# Patient Record
Sex: MALE | Race: WHITE | NOT HISPANIC OR LATINO
[De-identification: names, ages, dates, MRNs, and addresses within clinical notes are randomized per-mention and may not be internally consistent; named-entity substitution may affect disease eponyms.]

---

## 2017-08-23 PROBLEM — Z00.00 ENCOUNTER FOR PREVENTIVE HEALTH EXAMINATION: Status: ACTIVE | Noted: 2017-08-23

## 2017-09-05 ENCOUNTER — APPOINTMENT (OUTPATIENT)
Dept: SURGERY | Facility: CLINIC | Age: 62
End: 2017-09-05

## 2017-10-13 ENCOUNTER — RESULT REVIEW (OUTPATIENT)
Age: 62
End: 2017-10-13

## 2017-10-16 ENCOUNTER — OUTPATIENT (OUTPATIENT)
Dept: OUTPATIENT SERVICES | Facility: HOSPITAL | Age: 62
LOS: 1 days | End: 2017-10-16
Payer: COMMERCIAL

## 2017-10-16 DIAGNOSIS — D12.5 BENIGN NEOPLASM OF SIGMOID COLON: ICD-10-CM

## 2017-10-16 DIAGNOSIS — Z96.649 PRESENCE OF UNSPECIFIED ARTIFICIAL HIP JOINT: Chronic | ICD-10-CM

## 2017-10-17 ENCOUNTER — INPATIENT (INPATIENT)
Facility: HOSPITAL | Age: 62
LOS: 2 days | Discharge: HOME | DRG: 331 | End: 2017-10-20
Attending: COLON & RECTAL SURGERY | Admitting: COLON & RECTAL SURGERY
Payer: COMMERCIAL

## 2017-10-17 ENCOUNTER — RESULT REVIEW (OUTPATIENT)
Age: 62
End: 2017-10-17

## 2017-10-17 VITALS
SYSTOLIC BLOOD PRESSURE: 117 MMHG | RESPIRATION RATE: 18 BRPM | HEIGHT: 65 IN | OXYGEN SATURATION: 97 % | HEART RATE: 62 BPM | TEMPERATURE: 98 F | DIASTOLIC BLOOD PRESSURE: 57 MMHG | WEIGHT: 210.1 LBS

## 2017-10-17 DIAGNOSIS — Z96.649 PRESENCE OF UNSPECIFIED ARTIFICIAL HIP JOINT: Chronic | ICD-10-CM

## 2017-10-17 LAB
ANION GAP SERPL CALC-SCNC: 13 MMOL/L — SIGNIFICANT CHANGE UP (ref 5–17)
BUN SERPL-MCNC: 16 MG/DL — SIGNIFICANT CHANGE UP (ref 7–23)
CALCIUM SERPL-MCNC: 9.1 MG/DL — SIGNIFICANT CHANGE UP (ref 8.4–10.5)
CHLORIDE SERPL-SCNC: 102 MMOL/L — SIGNIFICANT CHANGE UP (ref 96–108)
CO2 SERPL-SCNC: 22 MMOL/L — SIGNIFICANT CHANGE UP (ref 22–31)
CREAT SERPL-MCNC: 1.11 MG/DL — SIGNIFICANT CHANGE UP (ref 0.5–1.3)
GLUCOSE BLDC GLUCOMTR-MCNC: 104 MG/DL — HIGH (ref 70–99)
GLUCOSE BLDC GLUCOMTR-MCNC: 120 MG/DL — HIGH (ref 70–99)
GLUCOSE BLDC GLUCOMTR-MCNC: 126 MG/DL — HIGH (ref 70–99)
GLUCOSE BLDC GLUCOMTR-MCNC: 144 MG/DL — HIGH (ref 70–99)
GLUCOSE SERPL-MCNC: 171 MG/DL — HIGH (ref 70–99)
HCT VFR BLD CALC: 37.2 % — LOW (ref 39–50)
HGB BLD-MCNC: 12.7 G/DL — LOW (ref 13–17)
MAGNESIUM SERPL-MCNC: 1.7 MG/DL — SIGNIFICANT CHANGE UP (ref 1.6–2.6)
MCHC RBC-ENTMCNC: 29.3 PG — SIGNIFICANT CHANGE UP (ref 27–34)
MCHC RBC-ENTMCNC: 34.1 G/DL — SIGNIFICANT CHANGE UP (ref 32–36)
MCV RBC AUTO: 85.7 FL — SIGNIFICANT CHANGE UP (ref 80–100)
PHOSPHATE SERPL-MCNC: 2.8 MG/DL — SIGNIFICANT CHANGE UP (ref 2.5–4.5)
PLATELET # BLD AUTO: 192 K/UL — SIGNIFICANT CHANGE UP (ref 150–400)
POTASSIUM SERPL-MCNC: 4 MMOL/L — SIGNIFICANT CHANGE UP (ref 3.5–5.3)
POTASSIUM SERPL-SCNC: 4 MMOL/L — SIGNIFICANT CHANGE UP (ref 3.5–5.3)
RBC # BLD: 4.34 M/UL — SIGNIFICANT CHANGE UP (ref 4.2–5.8)
RBC # FLD: 14.4 % — SIGNIFICANT CHANGE UP (ref 10.3–16.9)
SODIUM SERPL-SCNC: 137 MMOL/L — SIGNIFICANT CHANGE UP (ref 135–145)
SURGICAL PATHOLOGY STUDY: SIGNIFICANT CHANGE UP
WBC # BLD: 11.3 K/UL — HIGH (ref 3.8–10.5)
WBC # FLD AUTO: 11.3 K/UL — HIGH (ref 3.8–10.5)

## 2017-10-17 RX ORDER — SODIUM CHLORIDE 9 MG/ML
1000 INJECTION, SOLUTION INTRAVENOUS
Qty: 0 | Refills: 0 | Status: DISCONTINUED | OUTPATIENT
Start: 2017-10-17 | End: 2017-10-20

## 2017-10-17 RX ORDER — MAGNESIUM SULFATE 500 MG/ML
1 VIAL (ML) INJECTION ONCE
Qty: 0 | Refills: 0 | Status: COMPLETED | OUTPATIENT
Start: 2017-10-17 | End: 2017-10-17

## 2017-10-17 RX ORDER — ONDANSETRON 8 MG/1
4 TABLET, FILM COATED ORAL EVERY 6 HOURS
Qty: 0 | Refills: 0 | Status: DISCONTINUED | OUTPATIENT
Start: 2017-10-17 | End: 2017-10-20

## 2017-10-17 RX ORDER — KETOROLAC TROMETHAMINE 30 MG/ML
30 SYRINGE (ML) INJECTION EVERY 6 HOURS
Qty: 0 | Refills: 0 | Status: DISCONTINUED | OUTPATIENT
Start: 2017-10-17 | End: 2017-10-18

## 2017-10-17 RX ORDER — GLUCAGON INJECTION, SOLUTION 0.5 MG/.1ML
1 INJECTION, SOLUTION SUBCUTANEOUS ONCE
Qty: 0 | Refills: 0 | Status: DISCONTINUED | OUTPATIENT
Start: 2017-10-17 | End: 2017-10-20

## 2017-10-17 RX ORDER — DEXTROSE 50 % IN WATER 50 %
1 SYRINGE (ML) INTRAVENOUS ONCE
Qty: 0 | Refills: 0 | Status: DISCONTINUED | OUTPATIENT
Start: 2017-10-17 | End: 2017-10-20

## 2017-10-17 RX ORDER — DEXTROSE 50 % IN WATER 50 %
12.5 SYRINGE (ML) INTRAVENOUS ONCE
Qty: 0 | Refills: 0 | Status: DISCONTINUED | OUTPATIENT
Start: 2017-10-17 | End: 2017-10-20

## 2017-10-17 RX ORDER — DEXTROSE 50 % IN WATER 50 %
25 SYRINGE (ML) INTRAVENOUS ONCE
Qty: 0 | Refills: 0 | Status: DISCONTINUED | OUTPATIENT
Start: 2017-10-17 | End: 2017-10-20

## 2017-10-17 RX ORDER — METOCLOPRAMIDE HCL 10 MG
10 TABLET ORAL ONCE
Qty: 0 | Refills: 0 | Status: DISCONTINUED | OUTPATIENT
Start: 2017-10-17 | End: 2017-10-20

## 2017-10-17 RX ORDER — BUPIVACAINE 13.3 MG/ML
20 INJECTION, SUSPENSION, LIPOSOMAL INFILTRATION ONCE
Qty: 0 | Refills: 0 | Status: DISCONTINUED | OUTPATIENT
Start: 2017-10-17 | End: 2017-10-20

## 2017-10-17 RX ORDER — OLMESARTAN MEDOXOMIL 5 MG/1
1 TABLET, FILM COATED ORAL
Qty: 0 | Refills: 0 | COMMUNITY

## 2017-10-17 RX ORDER — ACETAMINOPHEN 500 MG
1000 TABLET ORAL ONCE
Qty: 0 | Refills: 0 | Status: COMPLETED | OUTPATIENT
Start: 2017-10-17 | End: 2017-10-17

## 2017-10-17 RX ORDER — HEPARIN SODIUM 5000 [USP'U]/ML
7500 INJECTION INTRAVENOUS; SUBCUTANEOUS EVERY 8 HOURS
Qty: 0 | Refills: 0 | Status: DISCONTINUED | OUTPATIENT
Start: 2017-10-17 | End: 2017-10-20

## 2017-10-17 RX ORDER — INSULIN LISPRO 100/ML
VIAL (ML) SUBCUTANEOUS
Qty: 0 | Refills: 0 | Status: DISCONTINUED | OUTPATIENT
Start: 2017-10-17 | End: 2017-10-20

## 2017-10-17 RX ORDER — ACETAMINOPHEN 500 MG
1000 TABLET ORAL EVERY 6 HOURS
Qty: 0 | Refills: 0 | Status: COMPLETED | OUTPATIENT
Start: 2017-10-17 | End: 2017-10-17

## 2017-10-17 RX ADMIN — Medication 30 MILLIGRAM(S): at 18:27

## 2017-10-17 RX ADMIN — Medication 400 MILLIGRAM(S): at 22:15

## 2017-10-17 RX ADMIN — Medication 30 MILLIGRAM(S): at 18:12

## 2017-10-17 RX ADMIN — Medication 400 MILLIGRAM(S): at 13:23

## 2017-10-17 RX ADMIN — Medication 1000 MILLIGRAM(S): at 22:30

## 2017-10-17 RX ADMIN — Medication 100 GRAM(S): at 13:57

## 2017-10-17 RX ADMIN — HEPARIN SODIUM 7500 UNIT(S): 5000 INJECTION INTRAVENOUS; SUBCUTANEOUS at 22:14

## 2017-10-17 RX ADMIN — Medication 1000 MILLIGRAM(S): at 14:02

## 2017-10-17 NOTE — H&P ADULT - HISTORY OF PRESENT ILLNESS
62M w/ recently found sigmoid polyp in the mid-sigmoid area on colonoscopy s/p biopsy showing adenocarcinoma now presents for elective resection after CT showed no metastatic disease.

## 2017-10-17 NOTE — PROGRESS NOTE ADULT - SUBJECTIVE AND OBJECTIVE BOX
POST-OPERATIVE NOTE    Procedure: laparoscopic sigmoidectomy    Diagnosis/Indication: sigmoid adenocarcinoma    Surgeon: Dr. Lusi    S: Pt has no complaints. Denies CP, SOB, COULTER, calf tenderness, nausea, and vomiting. Pain controlled with medication.     O:  T(C): 36.4 (10-17-17 @ 14:00), Max: 37 (10-17-17 @ 12:15)  T(F): 97.5 (10-17-17 @ 14:00), Max: 98.6 (10-17-17 @ 12:15)  HR: 56 (10-17-17 @ 14:00) (56 - 78)  BP: 116/62 (10-17-17 @ 14:00) (107/58 - 127/64)  RR: 19 (10-17-17 @ 14:00) (18 - 24)  SpO2: 100% (10-17-17 @ 14:00) (98% - 100%)  Wt(kg): --                        12.7   11.3  )-----------( 192      ( 17 Oct 2017 12:55 )             37.2     10-17    137  |  102  |  16  ----------------------------<  171<H>  4.0   |  22  |  1.11    Ca    9.1      17 Oct 2017 12:55  Phos  2.8     10-17  Mg     1.7     10-17        Gen: NAD, resting comfortably in bed  C/V: NSR  Pulm: Nonlabored breathing, no respiratory distress  Abd: soft, ND. appropriately tender to palpation in the LLQ  Extrem: WWP, no calf edema or tenderness, SCDs in place

## 2017-10-17 NOTE — BRIEF OPERATIVE NOTE - OPERATION/FINDINGS
heavy amounts of pericolonic fat appendages that increased difficulty of case but ultimately was able to complete procedure laparoscopically.  The tumor was encountered just distal to the tattoo and a 5 cm proximal and distal margin were obtained.  On inspection, a pedunculated polyp was seen inside the lumen and this was sent off to pathology.  Primary reanastomosis was fashioned using a 29 mm EEA stapler. heavy amounts of pericolonic fat appendages that increased difficulty of case but ultimately was able to complete procedure laparoscopically.  The tumor was encountered just distal to the tattoo and a 5 cm proximal and distal margin were obtained.  On inspection, a pedunculated polyp was seen inside the lumen and this was sent off to pathology.  Primary reanastomosis was fashioned using a 29 mm EEA stapler.  A saline leak test was negative.

## 2017-10-17 NOTE — H&P ADULT - ASSESSMENT
62M w/ recently found sigmoid polyp in the mid-sigmoid area on colonoscopy s/p biopsy showing adenocarcinoma now presents for elective resection after CT showed no metastatic disease, now s/p laparoscopic sigmoidectomy.    1. postop care s/p resection, doing well  - NPO / IVF  - pain control w/ acetaminophen, ketorolac, but no narcotics per Dr. Janelle vo POD1  - OOB/A  - HSQ/SCDS    2. DM, stable  - ISS    3. HTN, stable  - holding home olmesartan    4. dispo  - home w/o home care services

## 2017-10-17 NOTE — PROGRESS NOTE ADULT - ASSESSMENT
62M DM s/p lap sigmoidectomy    1. Postoperative care s/p laprascopic sigmoidectomy  NPO ( CLD post op day 1)  IVF  segura (d/c tomorrow 10/18)  IV tylenol/IV toradol for pain (no narcs per Dr. Luis)  SQH/ICD  am labs    2. DM  ISS    3, HTN  home meds being held  (benicar)    4. Dispo

## 2017-10-17 NOTE — BRIEF OPERATIVE NOTE - PROCEDURE
<<-----Click on this checkbox to enter Procedure Laparoscopic sigmoidectomy  10/17/2017    Active  [id number]

## 2017-10-17 NOTE — H&P ADULT - NSHPPHYSICALEXAM_GEN_ALL_CORE
Constitutional: no acute distress, NC/AT  Respiratory: CTA/B  Cardiovascular: RRR, no M/R/G  Gastrointestinal: soft, NT/ND  Genitourinary: no CVA TTP

## 2017-10-18 LAB
ANION GAP SERPL CALC-SCNC: 12 MMOL/L — SIGNIFICANT CHANGE UP (ref 5–17)
BASOPHILS NFR BLD AUTO: 0.1 % — SIGNIFICANT CHANGE UP (ref 0–2)
BUN SERPL-MCNC: 17 MG/DL — SIGNIFICANT CHANGE UP (ref 7–23)
CALCIUM SERPL-MCNC: 8.7 MG/DL — SIGNIFICANT CHANGE UP (ref 8.4–10.5)
CHLORIDE SERPL-SCNC: 102 MMOL/L — SIGNIFICANT CHANGE UP (ref 96–108)
CO2 SERPL-SCNC: 24 MMOL/L — SIGNIFICANT CHANGE UP (ref 22–31)
CREAT SERPL-MCNC: 0.96 MG/DL — SIGNIFICANT CHANGE UP (ref 0.5–1.3)
EOSINOPHIL NFR BLD AUTO: 1 % — SIGNIFICANT CHANGE UP (ref 0–6)
GLUCOSE BLDC GLUCOMTR-MCNC: 103 MG/DL — HIGH (ref 70–99)
GLUCOSE BLDC GLUCOMTR-MCNC: 127 MG/DL — HIGH (ref 70–99)
GLUCOSE BLDC GLUCOMTR-MCNC: 76 MG/DL — SIGNIFICANT CHANGE UP (ref 70–99)
GLUCOSE BLDC GLUCOMTR-MCNC: 94 MG/DL — SIGNIFICANT CHANGE UP (ref 70–99)
GLUCOSE SERPL-MCNC: 109 MG/DL — HIGH (ref 70–99)
HBA1C BLD-MCNC: 6 % — HIGH (ref 4–5.6)
HCT VFR BLD CALC: 34.5 % — LOW (ref 39–50)
HGB BLD-MCNC: 11.6 G/DL — LOW (ref 13–17)
LYMPHOCYTES # BLD AUTO: 28.6 % — SIGNIFICANT CHANGE UP (ref 13–44)
MAGNESIUM SERPL-MCNC: 2 MG/DL — SIGNIFICANT CHANGE UP (ref 1.6–2.6)
MCHC RBC-ENTMCNC: 28.9 PG — SIGNIFICANT CHANGE UP (ref 27–34)
MCHC RBC-ENTMCNC: 33.6 G/DL — SIGNIFICANT CHANGE UP (ref 32–36)
MCV RBC AUTO: 86 FL — SIGNIFICANT CHANGE UP (ref 80–100)
MONOCYTES NFR BLD AUTO: 12.7 % — SIGNIFICANT CHANGE UP (ref 2–14)
NEUTROPHILS NFR BLD AUTO: 57.6 % — SIGNIFICANT CHANGE UP (ref 43–77)
PHOSPHATE SERPL-MCNC: 3.3 MG/DL — SIGNIFICANT CHANGE UP (ref 2.5–4.5)
PLATELET # BLD AUTO: 171 K/UL — SIGNIFICANT CHANGE UP (ref 150–400)
POTASSIUM SERPL-MCNC: 3.8 MMOL/L — SIGNIFICANT CHANGE UP (ref 3.5–5.3)
POTASSIUM SERPL-SCNC: 3.8 MMOL/L — SIGNIFICANT CHANGE UP (ref 3.5–5.3)
RBC # BLD: 4.01 M/UL — LOW (ref 4.2–5.8)
RBC # FLD: 14.7 % — SIGNIFICANT CHANGE UP (ref 10.3–16.9)
SODIUM SERPL-SCNC: 138 MMOL/L — SIGNIFICANT CHANGE UP (ref 135–145)
WBC # BLD: 8.2 K/UL — SIGNIFICANT CHANGE UP (ref 3.8–10.5)
WBC # FLD AUTO: 8.2 K/UL — SIGNIFICANT CHANGE UP (ref 3.8–10.5)

## 2017-10-18 RX ORDER — POTASSIUM CHLORIDE 20 MEQ
20 PACKET (EA) ORAL ONCE
Qty: 0 | Refills: 0 | Status: COMPLETED | OUTPATIENT
Start: 2017-10-18 | End: 2017-10-18

## 2017-10-18 RX ORDER — LOSARTAN POTASSIUM 100 MG/1
50 TABLET, FILM COATED ORAL DAILY
Qty: 0 | Refills: 0 | Status: DISCONTINUED | OUTPATIENT
Start: 2017-10-18 | End: 2017-10-20

## 2017-10-18 RX ORDER — SODIUM CHLORIDE 9 MG/ML
500 INJECTION, SOLUTION INTRAVENOUS ONCE
Qty: 0 | Refills: 0 | Status: COMPLETED | OUTPATIENT
Start: 2017-10-18 | End: 2017-10-18

## 2017-10-18 RX ORDER — ACETAMINOPHEN 500 MG
650 TABLET ORAL ONCE
Qty: 0 | Refills: 0 | Status: COMPLETED | OUTPATIENT
Start: 2017-10-18 | End: 2017-10-18

## 2017-10-18 RX ORDER — ACETAMINOPHEN 500 MG
650 TABLET ORAL EVERY 6 HOURS
Qty: 0 | Refills: 0 | Status: DISCONTINUED | OUTPATIENT
Start: 2017-10-18 | End: 2017-10-20

## 2017-10-18 RX ADMIN — LOSARTAN POTASSIUM 50 MILLIGRAM(S): 100 TABLET, FILM COATED ORAL at 13:43

## 2017-10-18 RX ADMIN — SODIUM CHLORIDE 1500 MILLILITER(S): 9 INJECTION, SOLUTION INTRAVENOUS at 07:59

## 2017-10-18 RX ADMIN — Medication 650 MILLIGRAM(S): at 10:40

## 2017-10-18 RX ADMIN — Medication 20 MILLIEQUIVALENT(S): at 10:07

## 2017-10-18 RX ADMIN — Medication 30 MILLIGRAM(S): at 15:27

## 2017-10-18 RX ADMIN — Medication 30 MILLIGRAM(S): at 13:42

## 2017-10-18 RX ADMIN — HEPARIN SODIUM 7500 UNIT(S): 5000 INJECTION INTRAVENOUS; SUBCUTANEOUS at 21:23

## 2017-10-18 RX ADMIN — Medication 650 MILLIGRAM(S): at 21:22

## 2017-10-18 RX ADMIN — HEPARIN SODIUM 7500 UNIT(S): 5000 INJECTION INTRAVENOUS; SUBCUTANEOUS at 05:35

## 2017-10-18 RX ADMIN — Medication 650 MILLIGRAM(S): at 22:22

## 2017-10-18 RX ADMIN — Medication 30 MILLIGRAM(S): at 05:35

## 2017-10-18 RX ADMIN — HEPARIN SODIUM 7500 UNIT(S): 5000 INJECTION INTRAVENOUS; SUBCUTANEOUS at 13:43

## 2017-10-18 RX ADMIN — Medication 30 MILLIGRAM(S): at 05:50

## 2017-10-18 RX ADMIN — Medication 650 MILLIGRAM(S): at 10:07

## 2017-10-18 NOTE — PROGRESS NOTE ADULT - ASSESSMENT
POD#1 s/p laparoscopic sigmoid resection for sigmoid tumor, doing well.  1.  D/C [facility]  2.  Clear liquid diet.  Continue IVF at maintenance.  3.  IS, OOB, ambulate  4.  F/U Pathology

## 2017-10-18 NOTE — PROGRESS NOTE ADULT - SUBJECTIVE AND OBJECTIVE BOX
O/N: VSS, no ROBF, c/o chest pressure, EKG wnl, given 500cc bolus for low U/O, advanced to CLD for breakfast  10/17: s/p lap sigmoidectomy, POC wnl, pain controlled    STATUS POST: lap sigmoidectomy    POD# 1 SUBJECTIVE: Patient seen and examined bedside by chief resident. POD 1 s/p lap sigmoidectomy.  VSS, no ROBF, c/o chest pressure, EKG wnl, given 500cc bolus for low U/O, advanced to CLD for breakfast      Vital Signs Last 24 Hrs  T(C): 36.3 (18 Oct 2017 09:52), Max: 37.1 (17 Oct 2017 20:53)  T(F): 97.3 (18 Oct 2017 09:52), Max: 98.7 (17 Oct 2017 20:53)  HR: 61 (18 Oct 2017 09:52) (54 - 78)  BP: 108/66 (18 Oct 2017 09:52) (96/60 - 127/64)  BP(mean): 81 (17 Oct 2017 15:30) (76 - 87)  RR: 18 (18 Oct 2017 09:52) (16 - 24)  SpO2: 95% (18 Oct 2017 09:52) (95% - 100%)  I&O's Detail    17 Oct 2017 07:01  -  18 Oct 2017 07:00  --------------------------------------------------------  IN:    IV PiggyBack: 100 mL    lactated ringers.: 2625 mL    Other: 2200 mL  Total IN: 4925 mL    OUT:    Estimated Blood Loss: 100 mL    Indwelling Catheter - Urethral: 1050 mL  Total OUT: 1150 mL    Total NET: 3775 mL      18 Oct 2017 07:01  -  18 Oct 2017 12:13  --------------------------------------------------------  IN:    lactated ringers.: 375 mL    Oral Fluid: 300 mL  Total IN: 675 mL    OUT:  Total OUT: 0 mL    Total NET: 675 mL          General: NAD, resting comfortably in bed  Pulm: Nonlabored breathing, no respiratory distress  Abd: soft, NT/ND, incisions c/d/i  Extrem: WWP, no edema, SCDs in place        LABS:                        11.6   8.2   )-----------( 171      ( 18 Oct 2017 06:50 )             34.5     10-18    138  |  102  |  17  ----------------------------<  109<H>  3.8   |  24  |  0.96    Ca    8.7      18 Oct 2017 06:50  Phos  3.3     10-18  Mg     2.0     10-18

## 2017-10-18 NOTE — PROGRESS NOTE ADULT - SUBJECTIVE AND OBJECTIVE BOX
Attending Surgeon Progress Note    STATUS POST:  laparoscopic sigmoid colectomy  POST OPERATIVE DAY #: 1    SUBJECTIVE: Feels OK.  Pain controlled without narcotics.    Hungry: Y  Flatus: N  Bowel movement: N  Voiding spontaneously: N   Pain controlled: YES   Nausea: NO            Vomiting: NO  Diarrhea: NO         Chest Pain: NO    SOB: NO  OOB/Ambulating: Not yet    MEDICATIONS  (STANDING):  BUpivacaine liposome 1.3% Injectable (no eMAR) 20 milliLiter(s) Local Injection once  dextrose 5%. 1000 milliLiter(s) (50 mL/Hr) IV Continuous <Continuous>  dextrose 50% Injectable 12.5 Gram(s) IV Push once  dextrose 50% Injectable 25 Gram(s) IV Push once  dextrose 50% Injectable 25 Gram(s) IV Push once  heparin  Injectable 7500 Unit(s) SubCutaneous every 8 hours  insulin lispro (HumaLOG) corrective regimen sliding scale   SubCutaneous Before meals and at bedtime  lactated ringers. 1000 milliLiter(s) (125 mL/Hr) IV Continuous <Continuous>    MEDICATIONS  (PRN):  dextrose Gel 1 Dose(s) Oral once PRN Blood Glucose LESS THAN 70 milliGRAM(s)/deciliter  glucagon  Injectable 1 milliGRAM(s) IntraMuscular once PRN Glucose LESS THAN 70 milligrams/deciliter  ketorolac   Injectable 30 milliGRAM(s) IV Push every 6 hours PRN Moderate Pain  metoclopramide Injectable 10 milliGRAM(s) IV Push once PRN Nausea and/or Vomiting  ondansetron Injectable 4 milliGRAM(s) IV Push every 6 hours PRN Nausea      Vital Signs Last 24 Hrs  T(C): 37.1 (18 Oct 2017 05:42), Max: 37.1 (17 Oct 2017 20:53)  T(F): 98.7 (18 Oct 2017 05:42), Max: 98.7 (17 Oct 2017 20:53)  HR: 59 (18 Oct 2017 05:42) (54 - 78)  BP: 96/60 (18 Oct 2017 05:42) (96/60 - 127/64)  BP(mean): 81 (17 Oct 2017 15:30) (76 - 87)  RR: 16 (18 Oct 2017 05:42) (16 - 24)  SpO2: 95% (18 Oct 2017 05:42) (95% - 100%)    I&O's Detail    17 Oct 2017 07:01  -  18 Oct 2017 07:00  --------------------------------------------------------  IN:    IV PiggyBack: 100 mL    lactated ringers.: 2625 mL    Other: 2200 mL  Total IN: 4925 mL    OUT:    Estimated Blood Loss: 100 mL    Indwelling Catheter - Urethral: 1050 mL  Total OUT: 1150 mL    Total NET: 3775 mL          PHYSICAL EXAM:  Alert, oriented  Lungs:  CTA bilaterally, good inspiratory effort  Cor:  RRR  Abdomen:  soft, nondistended, mild incisional tenderness, +bowel sounds, incisions clean dry, intact with no erythema, mild bruising  Ext:  no edema, well-perfused      LABS:                        11.6   8.2   )-----------( 171      ( 18 Oct 2017 06:50 )             34.5     10-18    138  |  102  |  17  ----------------------------<  109<H>  3.8   |  24  |  0.96    Ca    8.7      18 Oct 2017 06:50  Phos  3.3     10-18  Mg     2.0     10-18

## 2017-10-18 NOTE — PROGRESS NOTE ADULT - ASSESSMENT
62M w/ recently found sigmoid polyp in the mid-sigmoid area on colonoscopy s/p biopsy showing adenocarcinoma now presents for elective resection after CT showed no metastatic disease, now s/p laparoscopic sigmoidectomy.    1. postop care s/p resection, doing well  - CLD / IVF  - pain control w/ acetaminophen, ketorolac, but no narcotics per Dr. Luis  - OOB/A  - HSQ/SCDS    2. DM, stable  - ISS    3. HTN, stable  - holding home olmesartan    4. dispo  - home w/o home care services 62M w/ recently found sigmoid polyp in the mid-sigmoid area on colonoscopy s/p biopsy showing adenocarcinoma now presents for elective resection after CT showed no metastatic disease, now s/p laparoscopic sigmoidectomy.    1. postop care s/p resection, doing well  - CLD / IVF  - pain control w/ acetaminophen, ketorolac, but no narcotics per Dr. Luis  - OOB/A  - HSQ/SCDS  -segura d/c'd    2. DM, stable  - ISS    3. HTN, stable  - on home olmesartan    4. dispo  - home w/o home care services

## 2017-10-19 LAB
ANION GAP SERPL CALC-SCNC: 13 MMOL/L — SIGNIFICANT CHANGE UP (ref 5–17)
BUN SERPL-MCNC: 9 MG/DL — SIGNIFICANT CHANGE UP (ref 7–23)
CALCIUM SERPL-MCNC: 9.1 MG/DL — SIGNIFICANT CHANGE UP (ref 8.4–10.5)
CHLORIDE SERPL-SCNC: 103 MMOL/L — SIGNIFICANT CHANGE UP (ref 96–108)
CO2 SERPL-SCNC: 25 MMOL/L — SIGNIFICANT CHANGE UP (ref 22–31)
CREAT SERPL-MCNC: 0.83 MG/DL — SIGNIFICANT CHANGE UP (ref 0.5–1.3)
GLUCOSE BLDC GLUCOMTR-MCNC: 110 MG/DL — HIGH (ref 70–99)
GLUCOSE BLDC GLUCOMTR-MCNC: 132 MG/DL — HIGH (ref 70–99)
GLUCOSE BLDC GLUCOMTR-MCNC: 148 MG/DL — HIGH (ref 70–99)
GLUCOSE BLDC GLUCOMTR-MCNC: 81 MG/DL — SIGNIFICANT CHANGE UP (ref 70–99)
GLUCOSE SERPL-MCNC: 99 MG/DL — SIGNIFICANT CHANGE UP (ref 70–99)
HCT VFR BLD CALC: 36.7 % — LOW (ref 39–50)
HGB BLD-MCNC: 12.4 G/DL — LOW (ref 13–17)
MAGNESIUM SERPL-MCNC: 1.8 MG/DL — SIGNIFICANT CHANGE UP (ref 1.6–2.6)
MCHC RBC-ENTMCNC: 29.2 PG — SIGNIFICANT CHANGE UP (ref 27–34)
MCHC RBC-ENTMCNC: 33.8 G/DL — SIGNIFICANT CHANGE UP (ref 32–36)
MCV RBC AUTO: 86.6 FL — SIGNIFICANT CHANGE UP (ref 80–100)
PHOSPHATE SERPL-MCNC: 2.7 MG/DL — SIGNIFICANT CHANGE UP (ref 2.5–4.5)
PLATELET # BLD AUTO: 205 K/UL — SIGNIFICANT CHANGE UP (ref 150–400)
POTASSIUM SERPL-MCNC: 3.9 MMOL/L — SIGNIFICANT CHANGE UP (ref 3.5–5.3)
POTASSIUM SERPL-SCNC: 3.9 MMOL/L — SIGNIFICANT CHANGE UP (ref 3.5–5.3)
RBC # BLD: 4.24 M/UL — SIGNIFICANT CHANGE UP (ref 4.2–5.8)
RBC # FLD: 14.6 % — SIGNIFICANT CHANGE UP (ref 10.3–16.9)
SODIUM SERPL-SCNC: 141 MMOL/L — SIGNIFICANT CHANGE UP (ref 135–145)
WBC # BLD: 8.8 K/UL — SIGNIFICANT CHANGE UP (ref 3.8–10.5)
WBC # FLD AUTO: 8.8 K/UL — SIGNIFICANT CHANGE UP (ref 3.8–10.5)

## 2017-10-19 RX ORDER — POTASSIUM PHOSPHATE, MONOBASIC POTASSIUM PHOSPHATE, DIBASIC 236; 224 MG/ML; MG/ML
15 INJECTION, SOLUTION INTRAVENOUS ONCE
Qty: 0 | Refills: 0 | Status: COMPLETED | OUTPATIENT
Start: 2017-10-19 | End: 2017-10-19

## 2017-10-19 RX ORDER — MAGNESIUM SULFATE 500 MG/ML
1 VIAL (ML) INJECTION ONCE
Qty: 0 | Refills: 0 | Status: COMPLETED | OUTPATIENT
Start: 2017-10-19 | End: 2017-10-19

## 2017-10-19 RX ADMIN — Medication 100 GRAM(S): at 10:36

## 2017-10-19 RX ADMIN — Medication 650 MILLIGRAM(S): at 09:27

## 2017-10-19 RX ADMIN — Medication 650 MILLIGRAM(S): at 10:00

## 2017-10-19 RX ADMIN — SODIUM CHLORIDE 125 MILLILITER(S): 9 INJECTION, SOLUTION INTRAVENOUS at 12:08

## 2017-10-19 RX ADMIN — Medication 650 MILLIGRAM(S): at 16:43

## 2017-10-19 RX ADMIN — HEPARIN SODIUM 7500 UNIT(S): 5000 INJECTION INTRAVENOUS; SUBCUTANEOUS at 22:00

## 2017-10-19 RX ADMIN — HEPARIN SODIUM 7500 UNIT(S): 5000 INJECTION INTRAVENOUS; SUBCUTANEOUS at 13:05

## 2017-10-19 RX ADMIN — HEPARIN SODIUM 7500 UNIT(S): 5000 INJECTION INTRAVENOUS; SUBCUTANEOUS at 05:11

## 2017-10-19 RX ADMIN — Medication 650 MILLIGRAM(S): at 17:30

## 2017-10-19 RX ADMIN — POTASSIUM PHOSPHATE, MONOBASIC POTASSIUM PHOSPHATE, DIBASIC 62.5 MILLIMOLE(S): 236; 224 INJECTION, SOLUTION INTRAVENOUS at 12:08

## 2017-10-19 RX ADMIN — LOSARTAN POTASSIUM 50 MILLIGRAM(S): 100 TABLET, FILM COATED ORAL at 05:11

## 2017-10-19 NOTE — PROGRESS NOTE ADULT - ASSESSMENT
62M w/ recently found sigmoid polyp in the mid-sigmoid area on colonoscopy s/p biopsy showing adenocarcinoma now presents for elective resection after CT showed no metastatic disease, now s/p laparoscopic sigmoidectomy.    1. postop care s/p resection, doing well  - CLD / IVF  - pain control w/ acetaminophen, ketorolac, but no narcotics per Dr. Luis  - OOB/A  - HSQ/SCDS    2. DM, stable  - ISS    3. HTN, stable  - on home olmesartan    4. dispo  - home w/o home care services 62M w/ recently found sigmoid polyp in the mid-sigmoid area on colonoscopy s/p biopsy showing adenocarcinoma now presents for elective resection after CT showed no metastatic disease, now s/p laparoscopic sigmoidectomy.    1. postop care s/p resection, doing well  - Low res / IVF  - pain control w/ acetaminophen, ketorolac, but no narcotics per Dr. Luis  - OOB/A  - HSQ/SCDS  - f/u pathology    2. DM, stable  - ISS    3. HTN, stable  - on home olmesartan    4. dispo  - home w/o home care services

## 2017-10-19 NOTE — PROGRESS NOTE ADULT - ASSESSMENT
POD#2 s/p laparoscopic sigmoid colectomy, doing well with signs of early return of bowel function.    1.  Low residue diet  2. Follow-up pathology  3.  Continue OOB/ambulation.

## 2017-10-19 NOTE — PROGRESS NOTE ADULT - SUBJECTIVE AND OBJECTIVE BOX
O/N: OMAR, VSS, passed TOV, +flatus/BM  10/18: segura d/c'd, clears, tylenol for breakthrough [id number] w/ recently found sigmoid polyp in the mid-sigmoid area on colonoscopy s/p biopsy showing adenocarcinoma now presents for elective resection after CT showed no metastatic disease, now s/p laparoscopic sigmoidectomy.    STATUS POST: laparoscopic sigmoidectomy    POD# 2 SUBJECTIVE: Patient seen and examined bedside by chief resident. No acute events overnight. Denies fever, chills, nausea, emesis, chest pain, dyspnea, abdominal pain. +flatus, denies BM. Adv diet to low res.    Vital Signs Last 24 Hrs  T(C): 37.2 (19 Oct 2017 05:41), Max: 37.2 (19 Oct 2017 05:41)  T(F): 98.9 (19 Oct 2017 05:41), Max: 98.9 (19 Oct 2017 05:41)  HR: 66 (19 Oct 2017 05:41) (61 - 79)  BP: 135/81 (19 Oct 2017 05:41) (105/62 - 147/70)  BP(mean): --  RR: 17 (19 Oct 2017 05:41) (17 - 18)  SpO2: 95% (19 Oct 2017 05:41) (93% - 95%)  I&O's Detail    18 Oct 2017 07:01  -  19 Oct 2017 07:00  --------------------------------------------------------  IN:    lactated ringers.: 2875 mL    Oral Fluid: 1120 mL  Total IN: 3995 mL    OUT:    Indwelling Catheter - Urethral: 450 mL    Voided: 1725 mL  Total OUT: 2175 mL    Total NET: 1820 mL    General: NAD, resting comfortably in bed  Pulm: Nonlabored breathing, no respiratory distress  Abd: soft, NT/ND, incisions c/d/i  Extrem: WWP, no edema, SCDs in place        LABS:                        12.4   8.8   )-----------( 205      ( 19 Oct 2017 07:29 )             36.7     10-19    141  |  103  |  9   ----------------------------<  99  3.9   |  25  |  0.83    Ca    9.1      19 Oct 2017 07:29  Phos  2.7     10-19  Mg     1.8     10-19

## 2017-10-19 NOTE — PROGRESS NOTE ADULT - SUBJECTIVE AND OBJECTIVE BOX
Attending Surgeon Progress Note    STATUS POST:  laparoscopic sigmoid colectomy  POST OPERATIVE DAY #: 2    SUBJECTIVE: feels "very good"  Hungry:  Y  Flatus: Y  Bowel movement: N  Voiding spontaneously: Y   Pain controlled: YES   Nausea: NO            Vomiting: NO  Diarrhea: NO         Chest Pain: NO    SOB: NO  OOB/Ambulating: Y    MEDICATIONS  (STANDING):  BUpivacaine liposome 1.3% Injectable (no eMAR) 20 milliLiter(s) Local Injection once  dextrose 5%. 1000 milliLiter(s) (50 mL/Hr) IV Continuous <Continuous>  dextrose 50% Injectable 12.5 Gram(s) IV Push once  dextrose 50% Injectable 25 Gram(s) IV Push once  dextrose 50% Injectable 25 Gram(s) IV Push once  heparin  Injectable 7500 Unit(s) SubCutaneous every 8 hours  insulin lispro (HumaLOG) corrective regimen sliding scale   SubCutaneous Before meals and at bedtime  lactated ringers. 1000 milliLiter(s) (125 mL/Hr) IV Continuous <Continuous>  losartan 50 milliGRAM(s) Oral daily    MEDICATIONS  (PRN):  acetaminophen   Tablet. 650 milliGRAM(s) Oral every 6 hours PRN Mild Pain (1 - 3)  dextrose Gel 1 Dose(s) Oral once PRN Blood Glucose LESS THAN 70 milliGRAM(s)/deciliter  glucagon  Injectable 1 milliGRAM(s) IntraMuscular once PRN Glucose LESS THAN 70 milligrams/deciliter  ketorolac   Injectable 30 milliGRAM(s) IV Push every 6 hours PRN Moderate Pain  metoclopramide Injectable 10 milliGRAM(s) IV Push once PRN Nausea and/or Vomiting  ondansetron Injectable 4 milliGRAM(s) IV Push every 6 hours PRN Nausea      Vital Signs Last 24 Hrs  T(C): 37.2 (19 Oct 2017 05:41), Max: 37.2 (19 Oct 2017 05:41)  T(F): 98.9 (19 Oct 2017 05:41), Max: 98.9 (19 Oct 2017 05:41)  HR: 66 (19 Oct 2017 05:41) (61 - 79)  BP: 135/81 (19 Oct 2017 05:41) (105/62 - 147/70)  RR: 17 (19 Oct 2017 05:41) (17 - 18)  SpO2: 95% (19 Oct 2017 05:41) (93% - 95%)    I&O's Detail    18 Oct 2017 07:01  -  19 Oct 2017 07:00  --------------------------------------------------------  IN:    lactated ringers.: 2500 mL    Oral Fluid: 1120 mL  Total IN: 3620 mL    OUT:    Indwelling Catheter - Urethral: 450 mL    Voided: 1725 mL  Total OUT: 2175 mL    Total NET: 1445 mL          PHYSICAL EXAM:  Alert, oriented  Lungs:  CTA bilaterally, good inspiratory effort  Cor:  RRR  Abdomen:  soft, nondistended, nontender, +bowel sounds, incisions clean dry, intact with no erythema  Ext:  no edema, well-perfused      LABS:                        11.6   8.2   )-----------( 171      ( 18 Oct 2017 06:50 )             34.5     10-18    138  |  102  |  17  ----------------------------<  109<H>  3.8   |  24  |  0.96    Ca    8.7      18 Oct 2017 06:50  Phos  3.3     10-18  Mg     2.0     10-18    PATHOLOGY: Pending

## 2017-10-20 ENCOUNTER — TRANSCRIPTION ENCOUNTER (OUTPATIENT)
Age: 62
End: 2017-10-20

## 2017-10-20 VITALS
RESPIRATION RATE: 16 BRPM | OXYGEN SATURATION: 97 % | DIASTOLIC BLOOD PRESSURE: 88 MMHG | SYSTOLIC BLOOD PRESSURE: 149 MMHG | HEART RATE: 61 BPM | TEMPERATURE: 98 F

## 2017-10-20 LAB
ANION GAP SERPL CALC-SCNC: 12 MMOL/L — SIGNIFICANT CHANGE UP (ref 5–17)
BUN SERPL-MCNC: 9 MG/DL — SIGNIFICANT CHANGE UP (ref 7–23)
CALCIUM SERPL-MCNC: 9 MG/DL — SIGNIFICANT CHANGE UP (ref 8.4–10.5)
CHLORIDE SERPL-SCNC: 104 MMOL/L — SIGNIFICANT CHANGE UP (ref 96–108)
CO2 SERPL-SCNC: 24 MMOL/L — SIGNIFICANT CHANGE UP (ref 22–31)
CREAT SERPL-MCNC: 0.86 MG/DL — SIGNIFICANT CHANGE UP (ref 0.5–1.3)
GLUCOSE BLDC GLUCOMTR-MCNC: 98 MG/DL — SIGNIFICANT CHANGE UP (ref 70–99)
GLUCOSE SERPL-MCNC: 103 MG/DL — HIGH (ref 70–99)
HCT VFR BLD CALC: 36.4 % — LOW (ref 39–50)
HGB BLD-MCNC: 12.2 G/DL — LOW (ref 13–17)
MAGNESIUM SERPL-MCNC: 2 MG/DL — SIGNIFICANT CHANGE UP (ref 1.6–2.6)
MCHC RBC-ENTMCNC: 29 PG — SIGNIFICANT CHANGE UP (ref 27–34)
MCHC RBC-ENTMCNC: 33.5 G/DL — SIGNIFICANT CHANGE UP (ref 32–36)
MCV RBC AUTO: 86.5 FL — SIGNIFICANT CHANGE UP (ref 80–100)
PHOSPHATE SERPL-MCNC: 2.9 MG/DL — SIGNIFICANT CHANGE UP (ref 2.5–4.5)
PLATELET # BLD AUTO: 212 K/UL — SIGNIFICANT CHANGE UP (ref 150–400)
POTASSIUM SERPL-MCNC: 4.1 MMOL/L — SIGNIFICANT CHANGE UP (ref 3.5–5.3)
POTASSIUM SERPL-SCNC: 4.1 MMOL/L — SIGNIFICANT CHANGE UP (ref 3.5–5.3)
RBC # BLD: 4.21 M/UL — SIGNIFICANT CHANGE UP (ref 4.2–5.8)
RBC # FLD: 14.3 % — SIGNIFICANT CHANGE UP (ref 10.3–16.9)
SODIUM SERPL-SCNC: 140 MMOL/L — SIGNIFICANT CHANGE UP (ref 135–145)
WBC # BLD: 9.1 K/UL — SIGNIFICANT CHANGE UP (ref 3.8–10.5)
WBC # FLD AUTO: 9.1 K/UL — SIGNIFICANT CHANGE UP (ref 3.8–10.5)

## 2017-10-20 PROCEDURE — 80048 BASIC METABOLIC PNL TOTAL CA: CPT

## 2017-10-20 PROCEDURE — 86900 BLOOD TYPING SEROLOGIC ABO: CPT

## 2017-10-20 PROCEDURE — 85025 COMPLETE CBC W/AUTO DIFF WBC: CPT

## 2017-10-20 PROCEDURE — 86901 BLOOD TYPING SEROLOGIC RH(D): CPT

## 2017-10-20 PROCEDURE — 86850 RBC ANTIBODY SCREEN: CPT

## 2017-10-20 PROCEDURE — 84100 ASSAY OF PHOSPHORUS: CPT

## 2017-10-20 PROCEDURE — 83036 HEMOGLOBIN GLYCOSYLATED A1C: CPT

## 2017-10-20 PROCEDURE — 82962 GLUCOSE BLOOD TEST: CPT

## 2017-10-20 PROCEDURE — 85027 COMPLETE CBC AUTOMATED: CPT

## 2017-10-20 PROCEDURE — 36415 COLL VENOUS BLD VENIPUNCTURE: CPT

## 2017-10-20 PROCEDURE — 88304 TISSUE EXAM BY PATHOLOGIST: CPT

## 2017-10-20 PROCEDURE — 83735 ASSAY OF MAGNESIUM: CPT

## 2017-10-20 PROCEDURE — 88309 TISSUE EXAM BY PATHOLOGIST: CPT

## 2017-10-20 RX ADMIN — HEPARIN SODIUM 7500 UNIT(S): 5000 INJECTION INTRAVENOUS; SUBCUTANEOUS at 06:00

## 2017-10-20 RX ADMIN — LOSARTAN POTASSIUM 50 MILLIGRAM(S): 100 TABLET, FILM COATED ORAL at 05:40

## 2017-10-20 NOTE — DISCHARGE NOTE ADULT - CARE PROVIDER_API CALL
Modesto Luis), ColonRectal Surgery; Surgery  [address] [place] [address]  [address] [place] [address]  Phone: [phone]  Fax: [phone]

## 2017-10-20 NOTE — DISCHARGE NOTE ADULT - HOSPITAL COURSE
62M w/ recently found sigmoid polyp in the mid-sigmoid area on colonoscopy s/p biopsy showing adenocarcinoma now presents for elective resection after CT showed no metastatic disease, now s/p laparoscopic sigmoidectomy. Patient's bowel function returned and tolerated PO intake. Patient's clinical status improved through hospital stay. Patient was hemodynamically stable, afebrile, pain adequately controlled, and with a plan for followup upon discharge.

## 2017-10-20 NOTE — PROGRESS NOTE ADULT - ASSESSMENT
62M w/ recently found sigmoid polyp in the mid-sigmoid area on colonoscopy s/p biopsy showing adenocarcinoma now presents for elective resection after CT showed no metastatic disease, now s/p laparoscopic sigmoidectomy.    1. postop care s/p resection, doing well  - Low res / IVF  - pain control w/ acetaminophen, ketorolac, but no narcotics per Dr. Luis  - OOB/A  - HSQ/SCDS  - f/u pathology    2. DM, stable  - ISS    3. HTN, stable  - on home olmesartan    4. dispo  - home w/o home care services

## 2017-10-20 NOTE — DISCHARGE NOTE ADULT - MEDICATION SUMMARY - MEDICATIONS TO TAKE
I will START or STAY ON the medications listed below when I get home from the hospital:    Benicar 20 mg oral tablet  -- 1 tab(s) by mouth once a day  -- Indication: For Home med

## 2017-10-20 NOTE — DISCHARGE NOTE ADULT - CARE PLAN
Principal Discharge DX:	Colon adenocarcinoma  Goal:	Recovery  Instructions for follow-up, activity and diet:	Please resume all regular home medications unless specifically advised not to take a particular medication. Also, please take any new medications as prescribed.  Please get plenty of rest, continue to ambulate several times per day, and drink adequate amounts of fluids. Avoid lifting weights greater than 5-10 lbs until you follow-up with your surgeon, who will instruct you further regarding activity restrictions.  Avoid driving or operating heavy machinery while taking pain medications.  Please follow-up with Dr. Luis within the next 1-2 weeks.  Incision Care:  *Please call Dr. Luis if you have increased pain, swelling, redness, or drainage from the incision site.  *Avoid swimming and baths until your follow-up appointment.  *You may shower, and wash surgical incisions with a mild soap and warm water. Gently pat the area dry.  *If you have steri-strips, they will fall off on their own. Please remove any remaining strips 7-10 days after surgery.

## 2017-10-20 NOTE — DISCHARGE NOTE ADULT - PLAN OF CARE
Recovery Please resume all regular home medications unless specifically advised not to take a particular medication. Also, please take any new medications as prescribed.  Please get plenty of rest, continue to ambulate several times per day, and drink adequate amounts of fluids. Avoid lifting weights greater than 5-10 lbs until you follow-up with your surgeon, who will instruct you further regarding activity restrictions.  Avoid driving or operating heavy machinery while taking pain medications.  Please follow-up with Dr. Luis within the next 1-2 weeks.  Incision Care:  *Please call Dr. Luis if you have increased pain, swelling, redness, or drainage from the incision site.  *Avoid swimming and baths until your follow-up appointment.  *You may shower, and wash surgical incisions with a mild soap and warm water. Gently pat the area dry.  *If you have steri-strips, they will fall off on their own. Please remove any remaining strips 7-10 days after surgery.

## 2017-10-23 LAB — SURGICAL PATHOLOGY STUDY: SIGNIFICANT CHANGE UP

## 2017-10-24 DIAGNOSIS — I10 ESSENTIAL (PRIMARY) HYPERTENSION: ICD-10-CM

## 2017-10-24 DIAGNOSIS — M19.90 UNSPECIFIED OSTEOARTHRITIS, UNSPECIFIED SITE: ICD-10-CM

## 2017-10-24 DIAGNOSIS — K63.5 POLYP OF COLON: ICD-10-CM

## 2017-10-24 DIAGNOSIS — C18.7 MALIGNANT NEOPLASM OF SIGMOID COLON: ICD-10-CM

## 2017-10-24 DIAGNOSIS — Z96.649 PRESENCE OF UNSPECIFIED ARTIFICIAL HIP JOINT: ICD-10-CM

## 2017-10-24 DIAGNOSIS — E66.9 OBESITY, UNSPECIFIED: ICD-10-CM

## 2017-10-24 DIAGNOSIS — E11.9 TYPE 2 DIABETES MELLITUS WITHOUT COMPLICATIONS: ICD-10-CM

## 2017-10-24 DIAGNOSIS — C18.9 MALIGNANT NEOPLASM OF COLON, UNSPECIFIED: ICD-10-CM

## 2017-11-20 PROBLEM — E11.9 TYPE 2 DIABETES MELLITUS WITHOUT COMPLICATIONS: Chronic | Status: ACTIVE | Noted: 2017-10-16

## 2017-11-20 PROBLEM — I10 ESSENTIAL (PRIMARY) HYPERTENSION: Chronic | Status: ACTIVE | Noted: 2017-10-16

## 2017-12-02 ENCOUNTER — OUTPATIENT (OUTPATIENT)
Dept: OUTPATIENT SERVICES | Facility: HOSPITAL | Age: 62
LOS: 1 days | End: 2017-12-02
Payer: COMMERCIAL

## 2017-12-02 DIAGNOSIS — Z96.649 PRESENCE OF UNSPECIFIED ARTIFICIAL HIP JOINT: Chronic | ICD-10-CM

## 2017-12-02 PROCEDURE — 74183 MRI ABD W/O CNTR FLWD CNTR: CPT | Mod: 26

## 2017-12-02 PROCEDURE — 74183 MRI ABD W/O CNTR FLWD CNTR: CPT

## 2017-12-02 PROCEDURE — A9585: CPT

## 2020-11-17 NOTE — DISCHARGE NOTE ADULT - CONDITIONS AT DISCHARGE
no cough/no dyspnea/no wheezing/no pleuritic chest pain/no hemoptysis Patient stable. VSS. No distress noted. Denies sob/cp. Tolerated diet well and voiding adequately. Bowel function is WDL.

## 2021-05-05 NOTE — DISCHARGE NOTE ADULT - NS MD DC PLAN IMMU FLU PROVIDE INFO
no rashes , no suspicious lesions , no areas of discoloration , no jaundice present , good turgor , no masses , no tenderness on palpation Risks/benefits discussed with patient or patient surrogate

## 2024-02-13 ENCOUNTER — APPOINTMENT (OUTPATIENT)
Dept: SURGICAL ONCOLOGY | Facility: CLINIC | Age: 69
End: 2024-02-13
Payer: MEDICARE

## 2024-02-13 VITALS
RESPIRATION RATE: 16 BRPM | SYSTOLIC BLOOD PRESSURE: 99 MMHG | HEART RATE: 71 BPM | DIASTOLIC BLOOD PRESSURE: 61 MMHG | BODY MASS INDEX: 34.62 KG/M2 | OXYGEN SATURATION: 97 % | TEMPERATURE: 98.4 F | WEIGHT: 181 LBS | HEIGHT: 60.5 IN

## 2024-02-13 DIAGNOSIS — Z87.19 PERSONAL HISTORY OF OTHER DISEASES OF THE DIGESTIVE SYSTEM: ICD-10-CM

## 2024-02-13 DIAGNOSIS — Z80.1 FAMILY HISTORY OF MALIGNANT NEOPLASM OF TRACHEA, BRONCHUS AND LUNG: ICD-10-CM

## 2024-02-13 DIAGNOSIS — K86.2 CYST OF PANCREAS: ICD-10-CM

## 2024-02-13 DIAGNOSIS — Z78.9 OTHER SPECIFIED HEALTH STATUS: ICD-10-CM

## 2024-02-13 DIAGNOSIS — Z83.3 FAMILY HISTORY OF DIABETES MELLITUS: ICD-10-CM

## 2024-02-13 DIAGNOSIS — K63.5 POLYP OF COLON: ICD-10-CM

## 2024-02-13 PROCEDURE — 99203 OFFICE O/P NEW LOW 30 MIN: CPT

## 2024-02-13 RX ORDER — DICYCLOMINE HYDROCHLORIDE 20 MG/1
20 TABLET ORAL
Refills: 0 | Status: ACTIVE | COMMUNITY

## 2024-02-13 RX ORDER — OLMESARTAN MEDOXOMIL 20 MG/1
20 TABLET, FILM COATED ORAL
Refills: 0 | Status: ACTIVE | COMMUNITY

## 2024-02-13 NOTE — HISTORY OF PRESENT ILLNESS
[de-identified] : Ander Padron  MRN: [id number]  Referring Provider: Dr. Tutu Mcqueen Date of visit: 2/13/24   Diagnosis:  Pancreatic cyst  70 y/o male with PMHx of DMII, HTN, Hip replacement, sigmoid resection on 10/17/17 (by Dr. Luis), Stage: Tis N0, presents for evaluation of pancreatic cyst. Has abdominal discomfort mid abdomen for last 3 months. Abdominal discomfort is after eating. Not related to any specific diet. He started on low fat and dairy free diet about 2 months ago. Appetite is ok. But sometimes has nausea only after breakfast.  Reports weight loss of 20 lbs in last 3 months.  Bowel movements are daily x1. Stools are hard. Needs to strain. Denies rectal bleeding. Taking herbal stools softener made with plums.  Last colonoscopy was 2018. Was normal as per patient and his daughter.  He is scheduled for colonoscopy and EGD with Zak Field on 2/16/24. Tel; [phone].  He is on Creon, ordered by GI Dr. Keon Hughes.   Work Up:  12/2/2017 MR Abdomen: IMPRESSION: 1. Compared to CT scan from 9/19/2017, there has been a decrease in the size of a cystic lesion in the uncinate process of the pancreas from 1.5 cm to 0.6 cm. Therefore, this lesion could represent a pseudocyst. There are also a few punctate cystic lesions in the pancreatic tail, which could also represent pseudocysts. Recommend correlation for history of pancreatitis. 2. There is a 1.4 cm liver mass which was not visualized on the prior CT scan. It has a nonspecific appearance. It could represent focal nodular hyperplasia. However, recommend correlation with PET scan to rule out metastatic disease. 3. There is a 1.9 cm complex cystic lesion projecting exophytically from the right kidney. This likely represents a hemorrhagic cyst. It is probably benign.  2/7/24 MRI ABD - showed large cyst  at uncinate process measure 10 x 10mm in size and at tail region measure 4 x 4mm in size.   Functional Status: Mr. PADRON is able to walk long distances without SOB. Able to get up and down the stairs with no issues.  Works as .

## 2024-02-13 NOTE — PHYSICAL EXAM
[Normal] : supple, no neck mass and thyroid not enlarged [Normal Neck Lymph Nodes] : normal neck lymph nodes  [Normal Supraclavicular Lymph Nodes] : normal supraclavicular lymph nodes [Normal Groin Lymph Nodes] : normal groin lymph nodes [Normal Axillary Lymph Nodes] : normal axillary lymph nodes [Normal] : oriented to person, place and time, with appropriate affect [de-identified] : Nonicteric

## 2024-02-13 NOTE — ASSESSMENT
[FreeTextEntry1] : Impression) 1 pancreas cyst 2 nonspecific abdominal pain  Plan) discussed with patient and daughter clinical findings.  With respect to the pancreas cysts it is a low risk cyst with no high risk or worrisome features and we can follow that along with imaging.  There is no evidence of pancreatitis on his imaging and while he had an elevation in his lipase a few weeks ago there is no imaging evidence of pancreatitis.  As far as his complaints of pain after eating especially after some fattier foods it could certainly be consistent with a gallbladder issue although there were no stones on the MRI of her in the bile duct.  Possibility of gallbladder dyskinesia exists and we will send him for a HIDA scan with ejection fraction.  Should that prove to be abnormal then I would recommend a cholecystectomy.  In addition we will repeat the serum amylase and lipase.  We will follow-up after that all questions answered  Serge Evans MD  Chief Surgical Oncology Multidisciplinary GI cancer program [facility]  Professor Surgery [facility]   CC Dr. Gonzalez

## 2024-02-23 ENCOUNTER — APPOINTMENT (OUTPATIENT)
Dept: NUCLEAR MEDICINE | Facility: HOSPITAL | Age: 69
End: 2024-02-23
Payer: MEDICARE

## 2024-02-23 ENCOUNTER — OUTPATIENT (OUTPATIENT)
Dept: OUTPATIENT SERVICES | Facility: HOSPITAL | Age: 69
LOS: 1 days | End: 2024-02-23
Payer: MEDICARE

## 2024-02-23 DIAGNOSIS — Z96.649 PRESENCE OF UNSPECIFIED ARTIFICIAL HIP JOINT: Chronic | ICD-10-CM

## 2024-02-23 PROCEDURE — A9537: CPT

## 2024-02-23 PROCEDURE — 78227 HEPATOBIL SYST IMAGE W/DRUG: CPT | Mod: 26

## 2024-02-23 PROCEDURE — 78227 HEPATOBIL SYST IMAGE W/DRUG: CPT

## 2024-03-05 ENCOUNTER — APPOINTMENT (OUTPATIENT)
Dept: SURGICAL ONCOLOGY | Facility: CLINIC | Age: 69
End: 2024-03-05

## 2024-10-21 NOTE — PATIENT PROFILE ADULT. - AS SC BRADEN MOISTURE
Hospital Medicine History & Physical      Date of Admission: 10/21/2024    Date of Service:  Pt seen/examined on 21 October 2024     [x]Admitted to Inpatient with expected LOS greater than two midnights due to medical therapy.  []Placed in Observation status.    Chief Admission Complaint:  R hand infection      Presenting Admission History:        43 y.o. male L hand dominant who presented to [facility] in transfer from [facility] with a 3 day hx of rapidly progressive erythema/edema c/w cellulitis, despite a day of PO Bactrim prescribed by Urgent care visit 20 October.      He underwent I&D at [facility] ED w/out complications and was given a dose of IV Vancomycin.     The patient denies any fever/chills or other signs/sxs of systemic illness or identifiable aggravating/alleviating factors\"      Assessment/Plan:      Current Principal Problem:  Cellulitis      Cellulitis - w/ abscess, s/p I&D at OSH.  Started on Vancomycin 21 October - continued. Orthopedic Surgery consulted and appreciated.      Discussed management and the need for Hospitalization of the patient w/ the Emergency Department Provider: Rocky Currie MD    CXR: I have reviewed the CXR with the following interpretation: N/A  EKG:  I have reviewed the EKG with the following interpretation: N/A    Physical Exam Performed:      There were no vitals taken for this visit.    General appearance:  No apparent distress, appears stated age and cooperative.  HEENT:  Pupils equal, round, and reactive to light. Conjunctivae/corneas clear.  Respiratory:  Normal respiratory effort. Clear to auscultation bilaterally without Rales/Wheezes/Rhonchi.  Cardiovascular:  Regular rate and rhythm with normal S1/S2 without murmurs, rubs or gallops.  Abdomen:  Soft, non-tender, non-distended with normal bowel sounds.  Musculoskeletal:  No clubbing, cyanosis or edema bilaterally.  Full range of motion without deformity.  Neurologic:  Neurovascularly intact without any 
(4) rarely moist